# Patient Record
Sex: MALE | Race: WHITE | NOT HISPANIC OR LATINO | Employment: UNEMPLOYED | URBAN - METROPOLITAN AREA
[De-identification: names, ages, dates, MRNs, and addresses within clinical notes are randomized per-mention and may not be internally consistent; named-entity substitution may affect disease eponyms.]

---

## 2018-03-10 ENCOUNTER — HOSPITAL ENCOUNTER (EMERGENCY)
Facility: HOSPITAL | Age: 38
Discharge: HOME | End: 2018-03-10
Attending: EMERGENCY MEDICINE
Payer: MEDICAID

## 2018-03-10 VITALS
OXYGEN SATURATION: 100 % | WEIGHT: 170 LBS | DIASTOLIC BLOOD PRESSURE: 82 MMHG | TEMPERATURE: 98 F | RESPIRATION RATE: 18 BRPM | HEART RATE: 102 BPM | HEIGHT: 70 IN | BODY MASS INDEX: 24.34 KG/M2 | SYSTOLIC BLOOD PRESSURE: 143 MMHG

## 2018-03-10 DIAGNOSIS — R00.0 RAPID HEART BEAT: ICD-10-CM

## 2018-03-10 DIAGNOSIS — F15.10 METHAMPHETAMINE ABUSE: Primary | ICD-10-CM

## 2018-03-10 DIAGNOSIS — F41.9 ANXIETY: ICD-10-CM

## 2018-03-10 LAB
ALBUMIN SERPL BCP-MCNC: 4.9 G/DL
ALP SERPL-CCNC: 64 U/L
ALT SERPL W/O P-5'-P-CCNC: 65 U/L
AMPHET+METHAMPHET UR QL: NORMAL
ANION GAP SERPL CALC-SCNC: 10 MMOL/L
AST SERPL-CCNC: 53 U/L
BARBITURATES UR QL SCN>200 NG/ML: NEGATIVE
BASOPHILS # BLD AUTO: 0.02 K/UL
BASOPHILS NFR BLD: 0.2 %
BENZODIAZ UR QL SCN>200 NG/ML: NEGATIVE
BILIRUB SERPL-MCNC: 0.9 MG/DL
BUN SERPL-MCNC: 10 MG/DL
BZE UR QL SCN: NEGATIVE
CALCIUM SERPL-MCNC: 10.3 MG/DL
CANNABINOIDS UR QL SCN: NORMAL
CHLORIDE SERPL-SCNC: 101 MMOL/L
CO2 SERPL-SCNC: 27 MMOL/L
CREAT SERPL-MCNC: 1.2 MG/DL
CREAT UR-MCNC: 133.4 MG/DL
DIFFERENTIAL METHOD: NORMAL
EOSINOPHIL # BLD AUTO: 0.1 K/UL
EOSINOPHIL NFR BLD: 0.8 %
ERYTHROCYTE [DISTWIDTH] IN BLOOD BY AUTOMATED COUNT: 11.8 %
EST. GFR  (AFRICAN AMERICAN): >60 ML/MIN/1.73 M^2
EST. GFR  (NON AFRICAN AMERICAN): >60 ML/MIN/1.73 M^2
GLUCOSE SERPL-MCNC: 104 MG/DL
HCT VFR BLD AUTO: 41.1 %
HGB BLD-MCNC: 14.5 G/DL
LYMPHOCYTES # BLD AUTO: 1.9 K/UL
LYMPHOCYTES NFR BLD: 18.4 %
MCH RBC QN AUTO: 30.1 PG
MCHC RBC AUTO-ENTMCNC: 35.3 G/DL
MCV RBC AUTO: 85 FL
METHADONE UR QL SCN>300 NG/ML: NEGATIVE
MONOCYTES # BLD AUTO: 0.9 K/UL
MONOCYTES NFR BLD: 9 %
NEUTROPHILS # BLD AUTO: 7.3 K/UL
NEUTROPHILS NFR BLD: 71.4 %
OPIATES UR QL SCN: NEGATIVE
PCP UR QL SCN>25 NG/ML: NEGATIVE
PLATELET # BLD AUTO: 206 K/UL
PMV BLD AUTO: 10.1 FL
POTASSIUM SERPL-SCNC: 3.8 MMOL/L
PROT SERPL-MCNC: 8.8 G/DL
RBC # BLD AUTO: 4.81 M/UL
SODIUM SERPL-SCNC: 138 MMOL/L
TOXICOLOGY INFORMATION: NORMAL
WBC # BLD AUTO: 10.22 K/UL

## 2018-03-10 PROCEDURE — 93005 ELECTROCARDIOGRAM TRACING: CPT

## 2018-03-10 PROCEDURE — 80053 COMPREHEN METABOLIC PANEL: CPT

## 2018-03-10 PROCEDURE — 25000003 PHARM REV CODE 250: Performed by: EMERGENCY MEDICINE

## 2018-03-10 PROCEDURE — 85025 COMPLETE CBC W/AUTO DIFF WBC: CPT

## 2018-03-10 PROCEDURE — 99284 EMERGENCY DEPT VISIT MOD MDM: CPT | Mod: 25

## 2018-03-10 PROCEDURE — 93010 ELECTROCARDIOGRAM REPORT: CPT | Mod: ,,, | Performed by: INTERNAL MEDICINE

## 2018-03-10 PROCEDURE — 80307 DRUG TEST PRSMV CHEM ANLYZR: CPT

## 2018-03-10 RX ORDER — LORAZEPAM 0.5 MG/1
1 TABLET ORAL
Status: COMPLETED | OUTPATIENT
Start: 2018-03-10 | End: 2018-03-10

## 2018-03-10 RX ADMIN — LORAZEPAM 1 MG: 0.5 TABLET ORAL at 03:03

## 2018-03-10 NOTE — ED TRIAGE NOTES
"PT REPORTS FEELING ANXIOUS AFTER SPENDING A DAY IN LEGAL CUSTODY FOR A WARRANT. PT STATES HE FEELS PARANOID AND HE FEELS EVERYONE AROUND HIM IS "ACTING REALLY WEIRD". PT DENIES DRUG OR ALCOHOL CONSUMPTION. PT IS UNABLE TO MAINTAIN EYE CONTACT. PT DENIES SI/HI. PT IS A POOR HISTORIAN, UNABLE TO ANSWER QUESTIONS REGARDING PMH. NKA.   "

## 2018-03-10 NOTE — ED NOTES
ATTEMPT TO START IV AND DRAW BLOOD FOR LABS. LABS WERE DRAWN BUT PT RIPPED OUT THE IV. PT REFUSING IV AT THIS TIME. MD MADE AWARE.

## 2018-03-10 NOTE — ED NOTES
PT REFUSING TO KEEP PULSE Ox, BP CUFF, AND EKG LEADS ON FOR CONTINUOUS MONITORING. PT PACING AROUND THE ROOM. MD MADE AWARE.

## 2018-03-10 NOTE — ED PROVIDER NOTES
"Encounter Date: 3/10/2018       History     Chief Complaint   Patient presents with    Anxiety     "I have anxiety disorder. Panic attacks." denies SI or HI; denies recent alcohol or drug usage     Had been feeling anxious for past 2 days. Also with ideation that lots of people that are not known to him are filming him on their phones. He had been picked up for an old warrant yesterday. Last drug use was marijuana 1 week ago. Had been relapsing on methamphetamine use. Last used about 2 weeks ago after a 1-2 month binge. Had no thoughts of harming self or others. Poor sleep recently. No physical symptoms.           Review of patient's allergies indicates:  No Known Allergies  History reviewed. No pertinent past medical history.  History reviewed. No pertinent surgical history.  History reviewed. No pertinent family history.  Social History   Substance Use Topics    Smoking status: Current Some Day Smoker    Smokeless tobacco: Not on file    Alcohol use Yes     Review of Systems   Constitutional: Positive for activity change and fatigue. Negative for chills, diaphoresis and fever.   HENT: Negative.    Eyes: Negative.    Respiratory: Negative.    Cardiovascular: Positive for palpitations.   Gastrointestinal: Negative.    Genitourinary: Negative.    Musculoskeletal: Negative.    Skin:        Superficial scrapes to arms from job as    Neurological: Negative.    Psychiatric/Behavioral: Positive for agitation, confusion, decreased concentration and hallucinations. Negative for self-injury. The patient is nervous/anxious.    All other systems reviewed and are negative.      Physical Exam     Initial Vitals [03/10/18 1430]   BP Pulse Resp Temp SpO2   138/89 (!) 137 20 98.1 °F (36.7 °C) 96 %      MAP       105.33         Physical Exam    Nursing note and vitals reviewed.  Constitutional: He appears well-developed and well-nourished.   Awake, alert, appropriate.   HENT:   Head: Normocephalic and atraumatic. "   Mouth/Throat: Oropharynx is clear and moist.   Eyes: Conjunctivae and EOM are normal. Pupils are equal, round, and reactive to light.   Neck: Normal range of motion. Neck supple.   Cardiovascular: Regular rhythm, normal heart sounds and intact distal pulses.   Mild tachycardia at about 110   Pulmonary/Chest: Breath sounds normal. No respiratory distress. He has no rhonchi. He exhibits no tenderness.   Abdominal: Soft. Bowel sounds are normal.   Musculoskeletal: Normal range of motion.   Neurological: He is alert and oriented to person, place, and time. He has normal strength. No cranial nerve deficit or sensory deficit.   Skin: Skin is warm and dry. Capillary refill takes less than 2 seconds.   Superficial abrasions to bilat upper exterm in various states of healing.    Psychiatric: His behavior is normal. Judgment and thought content normal.   Anxious.         ED Course   Procedures  Labs Reviewed   COMPREHENSIVE METABOLIC PANEL - Abnormal; Notable for the following:        Result Value    Total Protein 8.8 (*)     AST 53 (*)     ALT 65 (*)     All other components within normal limits   DRUG SCREEN PANEL, URINE EMERGENCY   CBC W/ AUTO DIFFERENTIAL     EKG Readings: (Independently Interpreted)   Initial Reading: No STEMI. Rhythm: Sinus Tachycardia. Heart Rate: 105. Ectopy: No Ectopy. Conduction: Normal. ST Segments: Normal ST Segments. T Waves: Normal. Axis: Normal. Clinical Impression: Sinus Tachycardia          Medical Decision Making:   ED Management:  Self d/c off of zoloft about a yr ago. Had been relapsing on speed intermittently. Situational anxiety related to recent arrest and poor sleep. No hi/si. No psychiatric emergent risk identified. Pt states tachycardia was because he ran about a mile to the hospital.     UDS seems to indicate more recent amphetamine use. Zackary d/c home with girlfriend.                       Clinical Impression:   The primary encounter diagnosis was Methamphetamine abuse. Diagnoses  of Rapid heart beat and Anxiety were also pertinent to this visit.                           Juan Pablo Brown MD  03/10/18 2335

## 2018-03-11 NOTE — ED NOTES
LAB RESULTS RECEIVED VIA FAX, GIVEN TO MD TO REVIEW. UA WAS RESULTED INSTEAD OF URINE DRUG SCREEN. LAB NOTIFIED, THEY WILL RUN THE URINE DRUG SCREEN NOW.

## 2020-02-15 ENCOUNTER — HOSPITAL ENCOUNTER (EMERGENCY)
Facility: HOSPITAL | Age: 40
Discharge: HOME | End: 2020-02-15
Attending: EMERGENCY MEDICINE
Payer: MEDICAID

## 2020-02-15 VITALS
WEIGHT: 195 LBS | SYSTOLIC BLOOD PRESSURE: 114 MMHG | HEART RATE: 99 BPM | RESPIRATION RATE: 18 BRPM | TEMPERATURE: 98 F | BODY MASS INDEX: 27.92 KG/M2 | HEIGHT: 70 IN | DIASTOLIC BLOOD PRESSURE: 56 MMHG | OXYGEN SATURATION: 95 %

## 2020-02-15 DIAGNOSIS — R11.2 NON-INTRACTABLE VOMITING WITH NAUSEA, UNSPECIFIED VOMITING TYPE: Primary | ICD-10-CM

## 2020-02-15 DIAGNOSIS — F19.10 POLYSUBSTANCE ABUSE: ICD-10-CM

## 2020-02-15 DIAGNOSIS — R00.0 TACHYCARDIA: ICD-10-CM

## 2020-02-15 LAB
ALBUMIN SERPL BCP-MCNC: 4 G/DL (ref 3.5–5.2)
ALP SERPL-CCNC: 109 U/L (ref 55–135)
ALT SERPL W/O P-5'-P-CCNC: 280 U/L (ref 10–44)
AMPHET+METHAMPHET UR QL: NORMAL
ANION GAP SERPL CALC-SCNC: 11 MMOL/L (ref 8–16)
AST SERPL-CCNC: 450 U/L (ref 10–40)
BARBITURATES UR QL SCN>200 NG/ML: NEGATIVE
BASOPHILS # BLD AUTO: 0.02 K/UL (ref 0–0.2)
BASOPHILS NFR BLD: 0.4 % (ref 0–1.9)
BENZODIAZ UR QL SCN>200 NG/ML: NEGATIVE
BILIRUB SERPL-MCNC: 1 MG/DL (ref 0.1–1)
BILIRUB UR QL STRIP: NEGATIVE
BUN SERPL-MCNC: 18 MG/DL (ref 6–20)
BZE UR QL SCN: NORMAL
CALCIUM SERPL-MCNC: 8.7 MG/DL (ref 8.7–10.5)
CANNABINOIDS UR QL SCN: NEGATIVE
CHLORIDE SERPL-SCNC: 107 MMOL/L (ref 95–110)
CK SERPL-CCNC: 152 U/L (ref 20–200)
CLARITY UR: CLEAR
CO2 SERPL-SCNC: 22 MMOL/L (ref 23–29)
COLOR UR: YELLOW
CREAT SERPL-MCNC: 0.9 MG/DL (ref 0.5–1.4)
CREAT UR-MCNC: 94.7 MG/DL (ref 23–375)
DIFFERENTIAL METHOD: ABNORMAL
EOSINOPHIL # BLD AUTO: 0 K/UL (ref 0–0.5)
EOSINOPHIL NFR BLD: 0.4 % (ref 0–8)
ERYTHROCYTE [DISTWIDTH] IN BLOOD BY AUTOMATED COUNT: 11.5 % (ref 11.5–14.5)
EST. GFR  (AFRICAN AMERICAN): >60 ML/MIN/1.73 M^2
EST. GFR  (NON AFRICAN AMERICAN): >60 ML/MIN/1.73 M^2
ETHANOL SERPL-MCNC: <10 MG/DL
GLUCOSE SERPL-MCNC: 97 MG/DL (ref 70–110)
GLUCOSE UR QL STRIP: NEGATIVE
HCT VFR BLD AUTO: 39.3 % (ref 40–54)
HGB BLD-MCNC: 13.3 G/DL (ref 14–18)
HGB UR QL STRIP: NEGATIVE
IMM GRANULOCYTES # BLD AUTO: 0.02 K/UL (ref 0–0.04)
IMM GRANULOCYTES NFR BLD AUTO: 0.4 % (ref 0–0.5)
KETONES UR QL STRIP: NEGATIVE
LEUKOCYTE ESTERASE UR QL STRIP: NEGATIVE
LYMPHOCYTES # BLD AUTO: 0.3 K/UL (ref 1–4.8)
LYMPHOCYTES NFR BLD: 5.1 % (ref 18–48)
MAGNESIUM SERPL-MCNC: 1.6 MG/DL (ref 1.6–2.6)
MCH RBC QN AUTO: 29.4 PG (ref 27–31)
MCHC RBC AUTO-ENTMCNC: 33.8 G/DL (ref 32–36)
MCV RBC AUTO: 87 FL (ref 82–98)
METHADONE UR QL SCN>300 NG/ML: NEGATIVE
MONOCYTES # BLD AUTO: 0 K/UL (ref 0.3–1)
MONOCYTES NFR BLD: 0.2 % (ref 4–15)
NEUTROPHILS # BLD AUTO: 5.2 K/UL (ref 1.8–7.7)
NEUTROPHILS NFR BLD: 93.5 % (ref 38–73)
NITRITE UR QL STRIP: NEGATIVE
NRBC BLD-RTO: 0 /100 WBC
OPIATES UR QL SCN: NEGATIVE
PCP UR QL SCN>25 NG/ML: NEGATIVE
PH UR STRIP: 5 [PH] (ref 5–8)
PHOSPHATE SERPL-MCNC: 1.4 MG/DL (ref 2.7–4.5)
PLATELET # BLD AUTO: 130 K/UL (ref 150–350)
PMV BLD AUTO: 9.4 FL (ref 9.2–12.9)
POTASSIUM SERPL-SCNC: 3.4 MMOL/L (ref 3.5–5.1)
PROT SERPL-MCNC: 7 G/DL (ref 6–8.4)
PROT UR QL STRIP: NEGATIVE
RBC # BLD AUTO: 4.53 M/UL (ref 4.6–6.2)
SODIUM SERPL-SCNC: 140 MMOL/L (ref 136–145)
SP GR UR STRIP: 1.01 (ref 1–1.03)
TOXICOLOGY INFORMATION: NORMAL
TSH SERPL DL<=0.005 MIU/L-ACNC: 2.72 UIU/ML (ref 0.4–4)
URN SPEC COLLECT METH UR: NORMAL
UROBILINOGEN UR STRIP-ACNC: NEGATIVE EU/DL
WBC # BLD AUTO: 5.52 K/UL (ref 3.9–12.7)

## 2020-02-15 PROCEDURE — 93005 ELECTROCARDIOGRAM TRACING: CPT

## 2020-02-15 PROCEDURE — 99284 EMERGENCY DEPT VISIT MOD MDM: CPT | Mod: 25

## 2020-02-15 PROCEDURE — 82550 ASSAY OF CK (CPK): CPT

## 2020-02-15 PROCEDURE — 80320 DRUG SCREEN QUANTALCOHOLS: CPT

## 2020-02-15 PROCEDURE — 96360 HYDRATION IV INFUSION INIT: CPT

## 2020-02-15 PROCEDURE — 84100 ASSAY OF PHOSPHORUS: CPT

## 2020-02-15 PROCEDURE — 93010 ELECTROCARDIOGRAM REPORT: CPT | Mod: ,,, | Performed by: INTERNAL MEDICINE

## 2020-02-15 PROCEDURE — 63600175 PHARM REV CODE 636 W HCPCS: Performed by: EMERGENCY MEDICINE

## 2020-02-15 PROCEDURE — 80053 COMPREHEN METABOLIC PANEL: CPT

## 2020-02-15 PROCEDURE — 80307 DRUG TEST PRSMV CHEM ANLYZR: CPT

## 2020-02-15 PROCEDURE — 83735 ASSAY OF MAGNESIUM: CPT

## 2020-02-15 PROCEDURE — 85025 COMPLETE CBC W/AUTO DIFF WBC: CPT

## 2020-02-15 PROCEDURE — 84443 ASSAY THYROID STIM HORMONE: CPT

## 2020-02-15 PROCEDURE — 93010 EKG 12-LEAD: ICD-10-PCS | Mod: ,,, | Performed by: INTERNAL MEDICINE

## 2020-02-15 PROCEDURE — 81003 URINALYSIS AUTO W/O SCOPE: CPT | Mod: 59

## 2020-02-15 RX ORDER — SERTRALINE HYDROCHLORIDE 100 MG/1
100 TABLET, FILM COATED ORAL
COMMUNITY
Start: 2019-11-12

## 2020-02-15 RX ORDER — ONDANSETRON 4 MG/1
4 TABLET, FILM COATED ORAL EVERY 6 HOURS
Qty: 12 TABLET | Refills: 0 | Status: SHIPPED | OUTPATIENT
Start: 2020-02-15

## 2020-02-15 RX ORDER — IBUPROFEN 600 MG/1
600 TABLET ORAL
COMMUNITY
Start: 2019-12-19

## 2020-02-15 RX ORDER — BUPROPION HYDROCHLORIDE 150 MG/1
150 TABLET ORAL
COMMUNITY

## 2020-02-15 RX ORDER — GABAPENTIN 800 MG/1
800 TABLET ORAL
COMMUNITY
Start: 2019-10-16

## 2020-02-15 RX ORDER — BUPRENORPHINE AND NALOXONE 8; 2 MG/1; MG/1
3 FILM, SOLUBLE BUCCAL; SUBLINGUAL
COMMUNITY
Start: 2020-01-08

## 2020-02-15 RX ORDER — HYDROXYZINE PAMOATE 100 MG/1
100 CAPSULE ORAL
COMMUNITY

## 2020-02-15 RX ORDER — ONDANSETRON 4 MG/1
4 TABLET, FILM COATED ORAL EVERY 6 HOURS
Qty: 12 TABLET | Refills: 0 | Status: SHIPPED | OUTPATIENT
Start: 2020-02-15 | End: 2020-02-15 | Stop reason: SDUPTHER

## 2020-02-15 RX ADMIN — SODIUM CHLORIDE 1000 ML: 0.9 INJECTION, SOLUTION INTRAVENOUS at 02:02

## 2020-02-15 NOTE — ED PROVIDER NOTES
Encounter Date: 2/15/2020    SCRIBE #1 NOTE: I, Blake Mahan, am scribing for, and in the presence of,  Marvin Gold MD. I have scribed the following portions of the note - Other sections scribed: HPI/ROS/PE.       History     Chief Complaint   Patient presents with    Drug Overdose     pt present to the ED via EMS reports shot up possible meth x 3 hrs ago. pt shot up in bilateral arms. pt c/o serve muscle cramps in the back; vomiting x 15 epiosides, dry mouth. pt reports he do not think it was meth.     Vomiting     This 39 y.o. male with a medical history of anxiety, depression, and PTSD presents to the ED via EMS for an emergent evaluation of multiple symptoms following drug usage today. Pt used meth today and injected it into the BUEs approximately 3 hours PTA. Since usage, pt reports n/v with about 15 episodes, severe back pain, generalized body aches, arm pain, mild cough, and chills. Pt reports he does not think the drug he used was meth because he has never experienced this feeling or symptoms when he used it before in the past. EMS suspected possible rhadomyolysis. No modifying factors. Otherwise, no fever, chills, and any other associated symptoms.     The history is provided by the patient. No  was used.     Review of patient's allergies indicates:  No Known Allergies  Past Medical History:   Diagnosis Date    Anxiety     Depression     PTSD (post-traumatic stress disorder)      History reviewed. No pertinent surgical history.  History reviewed. No pertinent family history.  Social History     Tobacco Use    Smoking status: Current Some Day Smoker   Substance Use Topics    Alcohol use: Yes    Drug use: Yes     Types: IV     Review of Systems   Constitutional: Positive for chills. Negative for activity change, diaphoresis and fever.   HENT: Negative for congestion, drooling, ear pain, rhinorrhea, sneezing, sore throat and trouble swallowing.    Eyes: Negative for  pain.   Respiratory: Positive for cough (mild). Negative for chest tightness, shortness of breath, wheezing and stridor.    Cardiovascular: Negative for chest pain, palpitations and leg swelling.   Gastrointestinal: Positive for nausea and vomiting. Negative for abdominal distention, abdominal pain, constipation and diarrhea.   Genitourinary: Negative for difficulty urinating, dysuria, frequency and urgency.   Musculoskeletal: Positive for back pain and myalgias (generalized; arm pain). Negative for arthralgias, neck pain and neck stiffness.   Skin: Negative for pallor, rash and wound.   Neurological: Negative for dizziness, syncope, weakness, light-headedness, numbness and headaches.   All other systems reviewed and are negative.      Physical Exam     Initial Vitals [02/15/20 0108]   BP Pulse Resp Temp SpO2   118/74 (!) 120 18 98.4 °F (36.9 °C) 99 %      MAP       --         Physical Exam    Nursing note and vitals reviewed.  Constitutional: He appears well-developed and well-nourished. He is not diaphoretic. No distress.   HENT:   Head: Normocephalic and atraumatic.   Right Ear: External ear normal.   Left Ear: External ear normal.   Mouth/Throat: Oropharynx is clear and moist.   Eyes: Right eye exhibits no discharge. Left eye exhibits no discharge. No scleral icterus.   Neck: No tracheal deviation present. No JVD present.   Cardiovascular: Regular rhythm, normal heart sounds and intact distal pulses. Tachycardia present.  Exam reveals no gallop and no friction rub.    No murmur heard.  Pulmonary/Chest: Breath sounds normal. No stridor. No respiratory distress. He has no wheezes. He has no rales.   Abdominal: Soft. He exhibits no distension. There is no tenderness. There is no guarding.   Musculoskeletal: Normal range of motion. He exhibits no edema or tenderness.   Neurological: He is alert and oriented to person, place, and time. He has normal strength. GCS eye subscore is 4. GCS verbal subscore is 5. GCS motor  subscore is 6.   LEANA with NGND's   Skin: Skin is warm and dry. Capillary refill takes less than 2 seconds. No rash noted. No erythema.   2 small abrasions to the bilateral, anterior antecubital regions of the UEs where pt injected IV drug.    Psychiatric: He has a normal mood and affect. His behavior is normal. Judgment and thought content normal.         ED Course   Procedures  Labs Reviewed   COMPREHENSIVE METABOLIC PANEL - Abnormal; Notable for the following components:       Result Value    Potassium 3.4 (*)     CO2 22 (*)      (*)      (*)     All other components within normal limits   CBC W/ AUTO DIFFERENTIAL - Abnormal; Notable for the following components:    RBC 4.53 (*)     Hemoglobin 13.3 (*)     Hematocrit 39.3 (*)     Platelets 130 (*)     Lymph # 0.3 (*)     Mono # 0.0 (*)     Gran% 93.5 (*)     Lymph% 5.1 (*)     Mono% 0.2 (*)     All other components within normal limits   PHOSPHORUS - Abnormal; Notable for the following components:    Phosphorus 1.4 (*)     All other components within normal limits   CK   MAGNESIUM   DRUG SCREEN PANEL, URINE EMERGENCY    Narrative:     Preferred Collection Type->Urine, Clean Catch   URINALYSIS, REFLEX TO URINE CULTURE    Narrative:     Preferred Collection Type->Urine, Clean Catch   TSH   ALCOHOL,MEDICAL (ETHANOL)          Imaging Results    None                     Scribe Attestation:   Scribe #1: I performed the above scribed service and the documentation accurately describes the services I performed. I attest to the accuracy of the note.      Pt arrived alert, afebrile, non-toxic in appearance, in no acute respiratory distress with tachycardia secondary to endorsed use of sympathomimetics PTA.  UDS positive for cocaine and meth with remainder of labs unremarkable.  Pt tolerated PO intake w/o difficulty.  PE unremarkable other than tachycardia that improved with IVF's.  Pt discharged and counseled on the need to return to the nearest emergency room  if they experience any other concerning symptoms.  Pt counseled to F/U outpatient with a PCP over the next two to three days.    Marvin Gold MD                            Clinical Impression:       ICD-10-CM ICD-9-CM   1. Non-intractable vomiting with nausea, unspecified vomiting type R11.2 787.01   2. Tachycardia R00.0 785.0   3. Polysubstance abuse F19.10 305.90             I, Marvin Gold, personally performed the services described in this documentation. All medical record entries made by the scribe were at my direction and in my presence.  I have reviewed the chart and agree that the record reflects my personal performance and is accurate and complete.                   Marvin Gold MD  02/15/20 2033

## 2020-02-15 NOTE — ED TRIAGE NOTES
pt present to the ED via EMS reports shot up possible meth x 3 hrs ago. pt shot up in bilateral arms. pt c/o serve muscle cramps in the back; vomiting x 15 epiosides, dry mouth. pt reports he do not think it was meth because he took meth before and never felt this way.

## 2020-06-03 ENCOUNTER — HOSPITAL ENCOUNTER (EMERGENCY)
Facility: HOSPITAL | Age: 40
Discharge: HOME | End: 2020-06-03
Attending: EMERGENCY MEDICINE
Payer: MEDICAID

## 2020-06-03 VITALS
RESPIRATION RATE: 18 BRPM | SYSTOLIC BLOOD PRESSURE: 115 MMHG | HEART RATE: 97 BPM | OXYGEN SATURATION: 100 % | BODY MASS INDEX: 27.98 KG/M2 | WEIGHT: 195 LBS | TEMPERATURE: 98 F | DIASTOLIC BLOOD PRESSURE: 72 MMHG

## 2020-06-03 DIAGNOSIS — R00.2 PALPITATIONS: Primary | ICD-10-CM

## 2020-06-03 DIAGNOSIS — F19.10 POLYSUBSTANCE ABUSE: ICD-10-CM

## 2020-06-03 DIAGNOSIS — R06.4 HYPERVENTILATION: ICD-10-CM

## 2020-06-03 DIAGNOSIS — F15.10 AMPHETAMINE ABUSE: ICD-10-CM

## 2020-06-03 DIAGNOSIS — R07.9 CHEST PAIN: ICD-10-CM

## 2020-06-03 DIAGNOSIS — F14.10 COCAINE ABUSE: ICD-10-CM

## 2020-06-03 LAB
ALBUMIN SERPL BCP-MCNC: 4.8 G/DL (ref 3.5–5.2)
ALP SERPL-CCNC: 70 U/L (ref 55–135)
ALT SERPL W/O P-5'-P-CCNC: 11 U/L (ref 10–44)
AMPHET+METHAMPHET UR QL: NORMAL
ANION GAP SERPL CALC-SCNC: 9 MMOL/L (ref 8–16)
AST SERPL-CCNC: 22 U/L (ref 10–40)
BARBITURATES UR QL SCN>200 NG/ML: NEGATIVE
BASOPHILS # BLD AUTO: 0.03 K/UL (ref 0–0.2)
BASOPHILS NFR BLD: 0.5 % (ref 0–1.9)
BENZODIAZ UR QL SCN>200 NG/ML: NEGATIVE
BILIRUB SERPL-MCNC: 0.5 MG/DL (ref 0.1–1)
BILIRUB UR QL STRIP: NEGATIVE
BNP SERPL-MCNC: <10 PG/ML (ref 0–99)
BUN SERPL-MCNC: 12 MG/DL (ref 6–20)
BZE UR QL SCN: NORMAL
CALCIUM SERPL-MCNC: 9.8 MG/DL (ref 8.7–10.5)
CANNABINOIDS UR QL SCN: NEGATIVE
CHLORIDE SERPL-SCNC: 104 MMOL/L (ref 95–110)
CLARITY UR: CLEAR
CO2 SERPL-SCNC: 23 MMOL/L (ref 23–29)
COLOR UR: YELLOW
CREAT SERPL-MCNC: 1.1 MG/DL (ref 0.5–1.4)
CREAT UR-MCNC: 152.5 MG/DL (ref 23–375)
D DIMER PPP IA.FEU-MCNC: 0.33 MG/L FEU
DIFFERENTIAL METHOD: NORMAL
EOSINOPHIL # BLD AUTO: 0 K/UL (ref 0–0.5)
EOSINOPHIL NFR BLD: 0.5 % (ref 0–8)
ERYTHROCYTE [DISTWIDTH] IN BLOOD BY AUTOMATED COUNT: 12.1 % (ref 11.5–14.5)
EST. GFR  (AFRICAN AMERICAN): >60 ML/MIN/1.73 M^2
EST. GFR  (NON AFRICAN AMERICAN): >60 ML/MIN/1.73 M^2
ETHANOL SERPL-MCNC: <10 MG/DL
GLUCOSE SERPL-MCNC: 152 MG/DL (ref 70–110)
GLUCOSE UR QL STRIP: NEGATIVE
HCT VFR BLD AUTO: 43.1 % (ref 40–54)
HGB BLD-MCNC: 14.9 G/DL (ref 14–18)
HGB UR QL STRIP: NEGATIVE
IMM GRANULOCYTES # BLD AUTO: 0.01 K/UL (ref 0–0.04)
IMM GRANULOCYTES NFR BLD AUTO: 0.2 % (ref 0–0.5)
KETONES UR QL STRIP: NEGATIVE
LEUKOCYTE ESTERASE UR QL STRIP: NEGATIVE
LYMPHOCYTES # BLD AUTO: 1.7 K/UL (ref 1–4.8)
LYMPHOCYTES NFR BLD: 26.7 % (ref 18–48)
MAGNESIUM SERPL-MCNC: 1.9 MG/DL (ref 1.6–2.6)
MCH RBC QN AUTO: 29.3 PG (ref 27–31)
MCHC RBC AUTO-ENTMCNC: 34.6 G/DL (ref 32–36)
MCV RBC AUTO: 85 FL (ref 82–98)
METHADONE UR QL SCN>300 NG/ML: NEGATIVE
MONOCYTES # BLD AUTO: 0.3 K/UL (ref 0.3–1)
MONOCYTES NFR BLD: 4.3 % (ref 4–15)
NEUTROPHILS # BLD AUTO: 4.4 K/UL (ref 1.8–7.7)
NEUTROPHILS NFR BLD: 67.8 % (ref 38–73)
NITRITE UR QL STRIP: NEGATIVE
NRBC BLD-RTO: 0 /100 WBC
OPIATES UR QL SCN: NEGATIVE
PCP UR QL SCN>25 NG/ML: NEGATIVE
PH UR STRIP: 6 [PH] (ref 5–8)
PLATELET # BLD AUTO: 187 K/UL (ref 150–350)
PMV BLD AUTO: 9.9 FL (ref 9.2–12.9)
POTASSIUM SERPL-SCNC: 3.5 MMOL/L (ref 3.5–5.1)
PROT SERPL-MCNC: 8.1 G/DL (ref 6–8.4)
PROT UR QL STRIP: NEGATIVE
RBC # BLD AUTO: 5.09 M/UL (ref 4.6–6.2)
SODIUM SERPL-SCNC: 136 MMOL/L (ref 136–145)
SP GR UR STRIP: 1.02 (ref 1–1.03)
TOXICOLOGY INFORMATION: NORMAL
TROPONIN I SERPL DL<=0.01 NG/ML-MCNC: <0.006 NG/ML (ref 0–0.03)
TROPONIN I SERPL DL<=0.01 NG/ML-MCNC: <0.006 NG/ML (ref 0–0.03)
URN SPEC COLLECT METH UR: ABNORMAL
UROBILINOGEN UR STRIP-ACNC: ABNORMAL EU/DL
WBC # BLD AUTO: 6.44 K/UL (ref 3.9–12.7)

## 2020-06-03 PROCEDURE — 80053 COMPREHEN METABOLIC PANEL: CPT

## 2020-06-03 PROCEDURE — 63600175 PHARM REV CODE 636 W HCPCS: Performed by: NURSE PRACTITIONER

## 2020-06-03 PROCEDURE — 99203 PR OFFICE/OUTPT VISIT, NEW, LEVL III, 30-44 MIN: ICD-10-PCS | Mod: 95,SA,HB, | Performed by: NURSE PRACTITIONER

## 2020-06-03 PROCEDURE — 93005 ELECTROCARDIOGRAM TRACING: CPT

## 2020-06-03 PROCEDURE — 96374 THER/PROPH/DIAG INJ IV PUSH: CPT

## 2020-06-03 PROCEDURE — 83735 ASSAY OF MAGNESIUM: CPT

## 2020-06-03 PROCEDURE — 63600175 PHARM REV CODE 636 W HCPCS

## 2020-06-03 PROCEDURE — 84484 ASSAY OF TROPONIN QUANT: CPT | Mod: 91

## 2020-06-03 PROCEDURE — 99285 EMERGENCY DEPT VISIT HI MDM: CPT | Mod: 25

## 2020-06-03 PROCEDURE — 84484 ASSAY OF TROPONIN QUANT: CPT

## 2020-06-03 PROCEDURE — 85025 COMPLETE CBC W/AUTO DIFF WBC: CPT

## 2020-06-03 PROCEDURE — 81003 URINALYSIS AUTO W/O SCOPE: CPT | Mod: 59

## 2020-06-03 PROCEDURE — 99203 OFFICE O/P NEW LOW 30 MIN: CPT | Mod: 95,SA,HB, | Performed by: NURSE PRACTITIONER

## 2020-06-03 PROCEDURE — 80320 DRUG SCREEN QUANTALCOHOLS: CPT

## 2020-06-03 PROCEDURE — 25500020 PHARM REV CODE 255: Performed by: EMERGENCY MEDICINE

## 2020-06-03 PROCEDURE — 85379 FIBRIN DEGRADATION QUANT: CPT

## 2020-06-03 PROCEDURE — 80307 DRUG TEST PRSMV CHEM ANLYZR: CPT

## 2020-06-03 PROCEDURE — 25000003 PHARM REV CODE 250: Performed by: NURSE PRACTITIONER

## 2020-06-03 PROCEDURE — 96361 HYDRATE IV INFUSION ADD-ON: CPT

## 2020-06-03 PROCEDURE — 96375 TX/PRO/DX INJ NEW DRUG ADDON: CPT

## 2020-06-03 PROCEDURE — 93010 EKG 12-LEAD: ICD-10-PCS | Mod: ,,, | Performed by: INTERNAL MEDICINE

## 2020-06-03 PROCEDURE — 83880 ASSAY OF NATRIURETIC PEPTIDE: CPT

## 2020-06-03 PROCEDURE — 93010 ELECTROCARDIOGRAM REPORT: CPT | Mod: ,,, | Performed by: INTERNAL MEDICINE

## 2020-06-03 RX ORDER — KETOROLAC TROMETHAMINE 30 MG/ML
15 INJECTION, SOLUTION INTRAMUSCULAR; INTRAVENOUS
Status: COMPLETED | OUTPATIENT
Start: 2020-06-03 | End: 2020-06-03

## 2020-06-03 RX ORDER — LORAZEPAM 2 MG/ML
INJECTION INTRAMUSCULAR
Status: COMPLETED
Start: 2020-06-03 | End: 2020-06-03

## 2020-06-03 RX ORDER — ONDANSETRON 2 MG/ML
4 INJECTION INTRAMUSCULAR; INTRAVENOUS
Status: COMPLETED | OUTPATIENT
Start: 2020-06-03 | End: 2020-06-03

## 2020-06-03 RX ORDER — LORAZEPAM 2 MG/ML
1 INJECTION INTRAMUSCULAR
Status: DISCONTINUED | OUTPATIENT
Start: 2020-06-03 | End: 2020-06-03

## 2020-06-03 RX ORDER — LORAZEPAM 2 MG/ML
1 INJECTION INTRAMUSCULAR
Status: COMPLETED | OUTPATIENT
Start: 2020-06-03 | End: 2020-06-03

## 2020-06-03 RX ADMIN — KETOROLAC TROMETHAMINE 15 MG: 30 INJECTION, SOLUTION INTRAMUSCULAR at 09:06

## 2020-06-03 RX ADMIN — LORAZEPAM 1 MG: 2 INJECTION INTRAMUSCULAR at 05:06

## 2020-06-03 RX ADMIN — SODIUM CHLORIDE 1000 ML: 0.9 INJECTION, SOLUTION INTRAVENOUS at 09:06

## 2020-06-03 RX ADMIN — ONDANSETRON HYDROCHLORIDE 4 MG: 2 SOLUTION INTRAMUSCULAR; INTRAVENOUS at 09:06

## 2020-06-03 RX ADMIN — IOHEXOL 100 ML: 350 INJECTION, SOLUTION INTRAVENOUS at 09:06

## 2020-06-03 RX ADMIN — LORAZEPAM 1 MG: 2 INJECTION, SOLUTION INTRAMUSCULAR; INTRAVENOUS at 05:06

## 2020-06-03 NOTE — ED PROVIDER NOTES
Encounter Date: 6/3/2020       History     Chief Complaint   Patient presents with    Palpitations     Pt c/o chest pain and palpitations x 1 hr, denies ETOH/drug use.  Pt noncompliant with IV attempt en route.       HPI   Patient is a 39-year-old male who presents with palpitations x1 hour.  He was seen recently at another facility for methamphetamine and cocaine abuse with similar symptoms.  He states he is not having pain anywhere but feels numbness in both of his arms.  He is anxious and hyperventilating during that shell assessment.  He endorses shortness of breath but denies nausea or vomiting.    Review of patient's allergies indicates:  No Known Allergies  Past Medical History:   Diagnosis Date    Anxiety     Depression     PTSD (post-traumatic stress disorder)      History reviewed. No pertinent surgical history.  History reviewed. No pertinent family history.  Social History     Tobacco Use    Smoking status: Current Some Day Smoker    Smokeless tobacco: Never Used   Substance Use Topics    Alcohol use: Yes    Drug use: Yes     Types: IV, Methamphetamines     Review of Systems   Constitutional: Negative for appetite change, chills, diaphoresis, fatigue and fever.   HENT: Negative for congestion, ear discharge, ear pain, postnasal drip, rhinorrhea, sinus pressure, sneezing, sore throat and voice change.    Eyes: Negative for discharge, itching and visual disturbance.   Respiratory: Negative for cough, shortness of breath and wheezing.    Cardiovascular: Positive for palpitations. Negative for chest pain and leg swelling.   Gastrointestinal: Negative for abdominal pain, nausea and vomiting.   Endocrine: Negative for polydipsia, polyphagia and polyuria.   Genitourinary: Negative for difficulty urinating, discharge, dysuria, frequency, hematuria, penile pain, penile swelling and urgency.   Musculoskeletal: Negative for arthralgias and myalgias.   Skin: Negative for rash and wound.   Neurological:  Negative for dizziness, seizures, syncope and weakness.   Hematological: Negative for adenopathy. Does not bruise/bleed easily.   Psychiatric/Behavioral: Negative for agitation and self-injury. The patient is not nervous/anxious.        Physical Exam     Initial Vitals [06/03/20 0524]   BP Pulse Resp Temp SpO2   138/76 95 18 98.4 °F (36.9 °C) 98 %      MAP       --         Physical Exam    Nursing note and vitals reviewed.  Constitutional: He appears well-developed and well-nourished. He is not diaphoretic. No distress. He is not intubated.   HENT:   Head: Normocephalic and atraumatic.   Right Ear: External ear normal.   Left Ear: External ear normal.   Nose: Nose normal.   Eyes: Pupils are equal, round, and reactive to light. Right eye exhibits no discharge. Left eye exhibits no discharge. No scleral icterus.   Neck: Normal range of motion.   Cardiovascular: Regular rhythm, S1 normal, S2 normal and normal heart sounds. Tachycardia present.  Exam reveals no gallop.    No murmur heard.  Pulmonary/Chest: Effort normal and breath sounds normal. No accessory muscle usage. Tachypnea noted. No apnea and no bradypnea. He is not intubated. No respiratory distress. He has no decreased breath sounds. He has no wheezes. He has no rhonchi. He has no rales.   Abdominal: He exhibits no distension.   Musculoskeletal: Normal range of motion.   Neurological: He is alert and oriented to person, place, and time.   Skin: Skin is dry. Capillary refill takes less than 2 seconds.         ED Course   Procedures  Labs Reviewed   COMPREHENSIVE METABOLIC PANEL - Abnormal; Notable for the following components:       Result Value    Glucose 152 (*)     All other components within normal limits   URINALYSIS, REFLEX TO URINE CULTURE - Abnormal; Notable for the following components:    Urobilinogen, UA 2.0-3.0 (*)     All other components within normal limits    Narrative:     Preferred Collection Type->Urine, Clean Catch   CBC W/ AUTO  DIFFERENTIAL   TROPONIN I   B-TYPE NATRIURETIC PEPTIDE   DRUG SCREEN PANEL, URINE EMERGENCY   ALCOHOL,MEDICAL (ETHANOL)   MAGNESIUM   TROPONIN I   D DIMER, QUANTITATIVE        ECG Results          EKG 12-lead (Final result)  Result time 06/03/20 19:54:28    Final result by Interface, Lab In [facility] (06/03/20 19:54:28)                 Narrative:    Test Reason : R07.9,    Vent. Rate : 151 BPM     Atrial Rate : 151 BPM     P-R Int : 128 ms          QRS Dur : 080 ms      QT Int : 316 ms       P-R-T Axes : 081 -40 083 degrees     QTc Int : 500 ms    Sinus tachycardia  Left axis deviation  Nonspecific ST and T wave abnormality  Abnormal ECG  When compared with ECG of 03-JUN-2020 05:32,  Significant changes have occurred  Confirmed by Cheyenne SAGE, Mara MERRITT ([id number]) on 6/3/2020 7:54:23 PM    Referred By: AAANENA   SELF           Confirmed By:Mara Quinn MD                            Imaging Results          CT Abdomen Pelvis With Contrast (Final result)  Result time 06/03/20 09:28:40    Final result by George Bell MD (06/03/20 09:28:40)                 Impression:      No acute findings.    Normal appendix.      Electronically signed by: George Bell MD  Date:    06/03/2020  Time:    09:28             Narrative:    EXAMINATION:  CT ABDOMEN PELVIS WITH CONTRAST    CLINICAL HISTORY:  RLQ pain, appendicitis suspected;    TECHNIQUE:  Low dose axial images, sagittal and coronal reformations were obtained from the lung bases to the pubic symphysis following the IV administration of 100 mL of Omnipaque 350    FINDINGS:  The visualized portion of the base of the lungs, visualized portion of the heart, stomach, spleen, pancreas, liver, gallbladder, adrenal glands, visualized portion of the aorta, and bladder are unremarkable.  The bowel is unremarkable.  The appendix has a normal appearance.  The osseous structures are unremarkable.                               X-Ray Chest AP Portable (Final result)  Result time 06/03/20 06:16:16  "   Final result by Ventura Raya MD (06/03/20 06:16:16)                 Impression:      There is no radiographic evidence for acute intrathoracic process.      Electronically signed by: Ventura Raya  Date:    06/03/2020  Time:    06:16             Narrative:    EXAMINATION:  XR CHEST AP PORTABLE    CLINICAL HISTORY:  Chest Pain;    TECHNIQUE:  Single frontal view of the chest was performed.    COMPARISON:  April 12, 2020    FINDINGS:  Single portable chest view is submitted.  Cardiomediastinal silhouette appears appropriate.  There is no evidence for confluent infiltrate or consolidation, significant pleural effusion or pneumothorax.  The osseous structures appear intact.                                       APC / Resident Notes:   39-year-old male who presents for palpitations that started 1 hr prior to arrival.  He was seen recently at another facility for cocaine and methamphetamine use.  He reports that this is a "panic attack".  He denies pain of any sort at this time but is tachypneic and tachycardic.  He states that both of his hands are numb and cramping.  On physical exam the patient is afebrile and nontoxic in no apparent distress with exception of tachypnea.  Differential diagnosis includes drug abuse, malingering, suicidal ideations, toxic ingestion, hyperventilation, hypercalcemia or other metabolic disturbance, ACS MI,SVT.  I have ordered that a nrb mask be placed without o2 to act as co2 reservoir as an intervention for hyperventilation and ativan 1mg be given.  Following these interventions patient's heart rate slowed considerably and he reported some improvement.  Laboratories are pending.              ED Course as of Jun 05 1732 Wed Jun 03, 2020   0558 BP: 138/88 [VC]   0558 Pulse: 97 [VC]   0558 Resp: 18 [VC]   0558 SpO2: 100 % [VC]   0601 CBC: leukocyte count was normal, the H&H was normal. The platelet count was normal.       CBC auto differential [VC]   0604 39-year-old male with a " history of methamphetamine and cocaine use presents for palpitations and anxiety.  He denies pain of any kind but reports numbness in both arms.  He is hyperventilating upon initial assessment.  Bilateral breath sounds are clear to auscultation there is no edema present.  Patient is not diaphoretic.  Heart rate is in the 140s at this time.  I have ordered Ativan 1 mg IV and a non-rebreather mask be applied without the use of oxygen for period of 5 min or until the patient calms.    [VC]   0605 BP: 130/80 [VC]   0605 Pulse(!): 118 [VC]   0605 Resp(!): 21 [VC]   0605 Improved.  Pt reports some improvement.   SpO2: 100 % [VC]   0609 EKG , UNIFOCAL PVC, NO STEMI.    [VC]   0609 The chemistry was negative for hypo-or hyper natremia, kalemia, chloridemia, or other electrolyte abnormalities; BUN and creatinine were within normal limits indicating normal kidney function, ALT and AST were within normal limits indicating normal liver function, there was no transaminitis.       Comprehensive metabolic panel(!) [VC]   0622 Troponin I: <0.006 [VC]   0622 Pulse: 99 [VC]   0622 Resp: 18 [VC]   0622 SpO2: 100 % [VC]   0622 There is no radiographic evidence for acute intrathoracic process.     X-Ray Chest AP Portable [VC]   0634 BNP: <10 [VC]   0638 Pulse: 99 [VC]   0638 Resp: 18 [VC]   0638 SpO2: 99 % [VC]   0702 BP: 125/83 [VC]   0702 Pulse: 100 [VC]   0702 Resp: 20 [VC]   0702 SpO2: 99 % [VC]   0716 Magnesium: 1.9 [VC]   0733 Pos cocaine and amphetamine.   Drug screen panel, emergency [VC]   0804 Alcohol, Medical, Serum: <10 [VC]   0856 SBAR given to Dr. Villa    [VC]   0916 BP(!): 141/85 [VC]   0916 Pulse: 96 [VC]   0916 Resp: 17 [VC]   0916 SpO2: 98 % [VC]   0930 Patient is now reporting abdominal pain and chest pain.  The abdominal pain is limited to the right lower quadrant and the chest pain is on the left.  The patient is not diaphoretic, he is not nauseated or vomiting.  On physical exam the abdomen is soft but  tender x4 quads flaquito in the right lower quad. RRR no MCR.  I have updated sbar to Dr. Villa, we will get troponin, ua, ddimer, ct abd pel with iv contrast.    [VC]   0934   No acute findings.    Normal appendix.   CT Abdomen Pelvis With Contrast [VC]   0943 Gu exam with Anthony, NP, chaperone.    [VC]   0946 D-Dimer: 0.33 [VC]   0951 Troponin I: <0.006 [VC]   1008 BP(!): 168/82 [VC]   1010 Patient informNurse that he was feeling psychotic and depressed, possibly suicidal.  I question the patient he stated that he is not actively suicidal at this time but that he is concerned that he may become that way.  He is requesting inpatient psychological care for drug addiction and depression.  He has a history of suicidal attempt as evidence by scar to the left wrist any also reports a history of paranoid psychosis.    [VC]   1020 Awaiting telepsych consult.    [VC]   1030 UA is negative for infection, no nitrites, leukocytes, blood, or protein present.     Urinalysis, Reflex to Urine Culture Urine, Clean Catch(!) [VC]   1031 SBAR to Dr. Villa updated.    [VC]      ED Course User Index  [VC] Riccardo Goodman, DNP     Tele psych consult states that he does not feel that the patient is actively suicidal and is okay to follow-up outpatient.  The patient reports resolution of his symptoms and is discharged home in good condition to follow-up with psychiatry referral from community resource sheet provided.  He should return for any worsening or changes in condition in any of his conditions.See above for analyses of radiology, labs, and events during pt's visit and direct actions taken. Symptomatic therapies and return precautions on AVS.   Medication choices were made after reviewing allergies, medications, history, available laboratories. See below for discharge prescriptions if any and disposition.             Clinical Impression:       ICD-10-CM ICD-9-CM   1. Palpitations R00.2 785.1   2. Chest pain R07.9 786.50   3.  Amphetamine abuse F15.10 305.70   4. Cocaine abuse F14.10 305.60   5. Hyperventilation R06.4 786.01   6. Polysubstance abuse F19.10 305.90         Disposition:   Disposition: Discharged  Condition: Stable     ED Disposition Condition    Discharge Stable        ED Prescriptions     None        Follow-up Information     Follow up With Specialties Details Why Contact Info    Princess FREDO Duke MD Internal Medicine, Pediatrics Schedule an appointment as soon as possible for a visit   [address]  [facility] [address]  [phone]                                       Riccardo Goodman, [facility]  06/05/20 1733

## 2020-06-03 NOTE — DISCHARGE INSTRUCTIONS
Stop using drugs.  Return to the Emergency department for any worsening or failure to improve, otherwise follow up with your primary care provider.

## 2020-06-03 NOTE — ED NOTES
"Pt is clinching fingers together stating "I am having a stroke", pt palms are sweaty, but when moved by nurse they easily separate until patient squeezes them together again allowing enough movement to hold his cell phone and push buttons on it, pt can move all extremities with equal grasps/strength, pt is hyperventilating while talking to family on the phone stating that he is dying and family is asked to call back later so we care for the patient, Pt HR is elevated which is consistent with hyperventilation and confirmed by Riccardo NP at bedside and verbal order for ativan.     "

## 2020-06-03 NOTE — ED NOTES
"MD and RN discussed feeling of depression and anxiety with pt. Pt states "he is torn about what to do with his life", "he has tried to get help for his addiction, but often relapses and find himself hanging around the wrong people". When RN asked if he has any thoughts of harming himself he responded with "I have to think of how to answer that" to MD pt said "he doesn't think he would but if he was in the right situation he might". Pt has a hx of suicide attempt. MD placed orders for tele psych.  "

## 2020-06-03 NOTE — CONSULTS
"[facility]  Psychiatry  Telepsychiatry Consult Note    Please see previous notes:    Patient agreeable to consultation via telepsychiatry.    Tele-Consultation from Psychiatry started: 6/3/2020 at 1100  The chief complaint leading to psychiatric consultation is:previous suicidality, depression, denies current si/hi but may be risk to self  This consultation was requested by Riccardo Goodman DNP, the Emergency Department attending physician.  The location of the consulting psychiatrist is [address] [place] [address].  The patient location is  [facility] EMERGENCY DEPARTMENT     Patient Identification:   Lawrence Rodriguez is a 39 y.o. male.    Patient information was obtained from patient.  Patient presented voluntarily to the Emergency Department by private vehicle.    Consults  Subjective:     History of Present Illness:    Per ED HPI: "Patient is a 39-year-old male who presents with palpitations x1 hour.  He was seen recently at another facility for methamphetamine and cocaine abuse with similar symptoms.  He states he is not having pain anywhere but feels numbness in both of his arms.  He is anxious and hyperventilating during that shell assessment.  He endorses shortness of breath but denies nausea or vomiting.Patient informNurse that he was feeling psychotic and depressed, possibly suicidal.  I question the patient he stated that he is not actively suicidal at this time but that he is concerned that he may become that way.  He is requesting inpatient psychological care for drug addiction and depression.  He has a history of suicidal attempt as evidence by scar to the left wrist any also reports a history of paranoid psychosis. Consulted Telepsych".     Psychiatric Interview:  Pt reports that he relapsed on cocaine.  States he lives with GF who also uses cocaine and knows he needs to go to rehab.  Pt denies SI/HI.  Pt reports that he would call 911 and seek help if he becomes suicidal.  Thought " "processes appear clean and organized. No paranoia.  Denies AH/VH.      Psychiatric History:   Previous Psychiatric Hospitalizations: Yes , multiple  Previous Medication Trials: Yes , multiple  Previous Suicide Attempts: yes hx of cutting wrist  History of Violence: no  History of Depression: yes  History of Fatimah: no  History of Auditory/Visual Hallucination no  History of Delusions: no  Outpatient psychiatrist (current & past): Yes    Substance Abuse History:  Tobacco:Yes  Alcohol: No  Illicit Substances:Yes  Detox/Rehab: Yes    Legal History: Past charges/incarcerations: denies     Family Psychiatric History: denies    Social History:  Developmental/Childhood:Achieved all developmental milestones timely  *Education:High School Diploma  Employment Status/Finances:Unemployed   Relationship Status/Sexual Orientation: lives with girlfriend  Children: 0  Housing Status: Home    history:  NO  Access to gun: NO    Psychiatric Mental Status Exam:  Arousal: alert  Sensorium/Orientation: oriented to grossly intact  Behavior/Cooperation: normal, cooperative   Speech: normal tone, normal rate, normal pitch, normal volume  Language: grossly intact  Mood: " okay "   Affect: appropriate  Thought Process: normal and logical  Thought Content:   Auditory hallucinations: NO  Visual hallucinations: NO  Paranoia: NO  Delusions:  NO  Suicidal ideation: NO  Homicidal ideation: NO  Attention/Concentration:  intact  Memory:    Recent:  Intact   Remote: Intact   3/3 immediate, 3/3 at 5 min  Fund of Knowledge: Intact   Abstract reasoning: proverbs were abstract  Insight: intact  Judgment: behavior is adequate to circumstances      Past Medical History:   Past Medical History:   Diagnosis Date    Anxiety     Depression     PTSD (post-traumatic stress disorder)       Laboratory Data:   Labs Reviewed   COMPREHENSIVE METABOLIC PANEL - Abnormal; Notable for the following components:       Result Value    Glucose 152 (*)     All other " components within normal limits   URINALYSIS, REFLEX TO URINE CULTURE - Abnormal; Notable for the following components:    Urobilinogen, UA 2.0-3.0 (*)     All other components within normal limits    Narrative:     Preferred Collection Type->Urine, Clean Catch   CBC W/ AUTO DIFFERENTIAL   TROPONIN I   B-TYPE NATRIURETIC PEPTIDE   DRUG SCREEN PANEL, URINE EMERGENCY   ALCOHOL,MEDICAL (ETHANOL)   MAGNESIUM   TROPONIN I   D DIMER, QUANTITATIVE     Neurological History:  Seizures: denies  Head trauma:  denies    Allergies:   Review of patient's allergies indicates:  No Known Allergies    Medications in ER:   Medications   lorazepam injection 1 mg (1 mg Intravenous Given 6/3/20 0558)   ondansetron injection 4 mg (4 mg Intravenous Given 6/3/20 0903)   sodium chloride 0.9% bolus 1,000 mL (1,000 mLs Intravenous New Bag 6/3/20 0903)   ketorolac injection 15 mg (15 mg Intravenous Given 6/3/20 0904)   iohexoL (OMNIPAQUE 350) injection 100 mL (100 mLs Intravenous Given 6/3/20 0919)       Medications at home: Zoloft, Gabapentin, Wellbutrin    No new subjective & objective note has been filed under this hospital service since the last note was generated.      Assessment - Diagnosis - Goals:     Diagnosis/Impression: Polysubstance Abuse    Rec: Pt currently does not meet criteria for PEC or involuntary inpatient treatment.  Pt denies SI/HI/AVH and verbally contracts for safety. Follow-up with outpatient provider. Please have  provide resources for drug rehab.  No medication changes recommended at this time.      [facility] ( Men & Women) - DOES NOT ACCEPT PRIVATE INSURANCE - DOES NOT ACCEPT [facility]  [phone]  [address] ( MEN) : [phone] Fax:[phone]  [facility] [place] [address]  **Only accepts Medicaid and has to be primary Insurance   [facility] (Men) Only takes Medicaid  [address] [facility] [place]  [phone] AAMIR fax [phone]  Extension: 1000 for  admissions  Extension: 1029 for clinical director (if PT needs referral from [facility] to other Facility)  [facility]  [facility] (Women)- - Only takes Medicaid  [address] [facility] [address]  [phone]     [facility] ( MEN): [address], [place] [address]  [phone] - Only takes Medicaid Fax [phone]   [employer] - Medicaid Plans: [employer]; [employer]; [employer]; [employer], [place] [employer], Other Insurance  [address] [place] [address]  Phone: [phone] or [phone]  Fax: [phone]    Time with patient: 30 minutes    More than 50% of the time was spent counseling/coordinating care    Consulting clinician was informed of the encounter and consult note.    Consultation ended: 6/3/2020 at 1130    Sumit Womack III, NP   Psychiatry  [facility]
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
## 2020-06-03 NOTE — ED NOTES
Pt is redirectable, and vitals return to normal after placing non-rebreather with no oxygen and talking to patient about home meds.  Pt is paranoid and defensive.  NP Riccardo at bedside, family told that they will have to call back and were ensured of patient safety due to enormous patient distraction.

## 2020-06-03 NOTE — ED NOTES
Pt arrives via EMS after c/o chest pain and palpitations x 1hr pt denies drug use today.  NSR, VSS

## 2022-10-05 NOTE — ED NOTES
Patient placed on continuous cardiac monitor, automatic blood pressure cuff and continuous pulse oximeter.   Stelara Pregnancy And Lactation Text: This medication is Pregnancy Category B and is considered safe during pregnancy. It is unknown if this medication is excreted in breast milk.

## 2022-12-12 ENCOUNTER — HOSPITAL ENCOUNTER (EMERGENCY)
Facility: OTHER | Age: 42
Discharge: HOME | End: 2022-12-12
Attending: EMERGENCY MEDICINE
Payer: MEDICAID

## 2022-12-12 VITALS
OXYGEN SATURATION: 95 % | HEIGHT: 70 IN | RESPIRATION RATE: 18 BRPM | BODY MASS INDEX: 25.05 KG/M2 | HEART RATE: 62 BPM | TEMPERATURE: 99 F | SYSTOLIC BLOOD PRESSURE: 138 MMHG | WEIGHT: 175 LBS | DIASTOLIC BLOOD PRESSURE: 79 MMHG

## 2022-12-12 DIAGNOSIS — M54.9 BACK PAIN, UNSPECIFIED BACK LOCATION, UNSPECIFIED BACK PAIN LATERALITY, UNSPECIFIED CHRONICITY: ICD-10-CM

## 2022-12-12 DIAGNOSIS — M48.00 SPINAL STENOSIS, UNSPECIFIED SPINAL REGION: Primary | ICD-10-CM

## 2022-12-12 DIAGNOSIS — M54.16 LUMBAR RADICULOPATHY: ICD-10-CM

## 2022-12-12 PROCEDURE — 25000003 PHARM REV CODE 250

## 2022-12-12 PROCEDURE — 63600175 PHARM REV CODE 636 W HCPCS

## 2022-12-12 PROCEDURE — 96372 THER/PROPH/DIAG INJ SC/IM: CPT

## 2022-12-12 PROCEDURE — 99285 EMERGENCY DEPT VISIT HI MDM: CPT | Mod: 25

## 2022-12-12 RX ORDER — DEXAMETHASONE SODIUM PHOSPHATE 4 MG/ML
8 INJECTION, SOLUTION INTRA-ARTICULAR; INTRALESIONAL; INTRAMUSCULAR; INTRAVENOUS; SOFT TISSUE
Status: COMPLETED | OUTPATIENT
Start: 2022-12-12 | End: 2022-12-12

## 2022-12-12 RX ORDER — DICLOFENAC SODIUM 10 MG/G
2 GEL TOPICAL 4 TIMES DAILY
Qty: 100 G | Refills: 0 | Status: SHIPPED | OUTPATIENT
Start: 2022-12-12

## 2022-12-12 RX ORDER — IBUPROFEN 600 MG/1
600 TABLET ORAL EVERY 6 HOURS PRN
Qty: 20 TABLET | Refills: 0 | Status: SHIPPED | OUTPATIENT
Start: 2022-12-12

## 2022-12-12 RX ORDER — ORPHENADRINE CITRATE 100 MG/1
100 TABLET, EXTENDED RELEASE ORAL
Status: COMPLETED | OUTPATIENT
Start: 2022-12-12 | End: 2022-12-12

## 2022-12-12 RX ORDER — LIDOCAINE 50 MG/G
1 PATCH TOPICAL
Status: DISCONTINUED | OUTPATIENT
Start: 2022-12-12 | End: 2022-12-12 | Stop reason: HOSPADM

## 2022-12-12 RX ORDER — LIDOCAINE 50 MG/G
1 PATCH TOPICAL DAILY
Qty: 15 PATCH | Refills: 0 | Status: SHIPPED | OUTPATIENT
Start: 2022-12-12

## 2022-12-12 RX ORDER — CYCLOBENZAPRINE HCL 10 MG
10 TABLET ORAL 3 TIMES DAILY PRN
Qty: 15 TABLET | Refills: 0 | Status: SHIPPED | OUTPATIENT
Start: 2022-12-12 | End: 2022-12-17

## 2022-12-12 RX ORDER — KETOROLAC TROMETHAMINE 30 MG/ML
30 INJECTION, SOLUTION INTRAMUSCULAR; INTRAVENOUS
Status: COMPLETED | OUTPATIENT
Start: 2022-12-12 | End: 2022-12-12

## 2022-12-12 RX ADMIN — KETOROLAC TROMETHAMINE 30 MG: 30 INJECTION, SOLUTION INTRAMUSCULAR; INTRAVENOUS at 05:12

## 2022-12-12 RX ADMIN — LIDOCAINE 1 PATCH: 50 PATCH CUTANEOUS at 05:12

## 2022-12-12 RX ADMIN — ORPHENADRINE CITRATE 100 MG: 100 TABLET, EXTENDED RELEASE ORAL at 05:12

## 2022-12-12 RX ADMIN — DEXAMETHASONE SODIUM PHOSPHATE 8 MG: 4 INJECTION INTRA-ARTICULAR; INTRALESIONAL; INTRAMUSCULAR; INTRAVENOUS; SOFT TISSUE at 05:12

## 2022-12-12 NOTE — ED PROVIDER NOTES
Encounter Date: 12/12/2022    SCRIBE #1 NOTE: I, Maria R Horan, am scribing for, and in the presence of,  Everette Church MD. I have scribed the entire note.     History     Chief Complaint   Patient presents with    Back Pain     Patient presented to ER with complaint of lower back and right hip pain. Pt stated he has 3 ruptured discs in his back. Pt stated he walked 4 miles yesterday.      Time seen by provider: 5:19 PM    This is a 42 y.o. male who presents with complaint of     The history is provided by the patient.   Review of patient's allergies indicates:  No Known Allergies  Past Medical History:   Diagnosis Date    Anxiety     Depression     PTSD (post-traumatic stress disorder)      No past surgical history on file.  No family history on file.  Social History     Tobacco Use    Smoking status: Some Days    Smokeless tobacco: Never   Substance Use Topics    Alcohol use: Yes    Drug use: Yes     Types: IV, Methamphetamines     Review of Systems  Constitutional-no fever  HEENT-no congestion  Eyes-no redness  Respiratory-no shortness of breath  Cardio-no chest pain  GI-no abdominal pain  Endocrine-no cold intolerance  -no difficulty urinating  MSK-no myalgias  Skin-no rashes  Allergy-no environmental allergy  Neurologic-, no headache  Hematology-no swollen nodes  Behavioral-no confusion   Physical Exam     Initial Vitals [12/12/22 1546]   BP Pulse Resp Temp SpO2   (!) 133/91 84 17 99.1 °F (37.3 °C) 100 %      MAP       --         Physical Exam  Constitutional: ***Well appearing, no distress.  Eyes: Conjunctivae normal.  ENT       Head: Normocephalic, atraumatic.       Nose: No congestion.       Mouth/Throat: Mucous membranes are moist.  Hematological/Lymphatic/Immunilogical: No cervical lymphadenopathy.  Cardiovascular: Normal rate, regular rhythm. Normal and symmetric distal pulses.  Respiratory: Normal respiratory effort. Breath sounds are normal.  Gastrointestinal: Soft, nontender.  "  Musculoskeletal: Normal range of motion in all extremities. No obvious deformities or swelling.  Neurologic: Alert, oriented. Normal speech and language. No gross focal neurologic deficits are appreciated.  Skin: Skin is warm, dry. No rash noted.  Psychiatric: Mood and affect are normal.    ED Course   Procedures  Labs Reviewed - No data to display       Imaging Results              CT Lumbar Spine Without Contrast (In process)                      Medications   dexAMETHasone injection 8 mg (has no administration in time range)   ketorolac injection 30 mg (has no administration in time range)   orphenadrine 12 hr tablet 100 mg (has no administration in time range)   LIDOcaine 5 % patch 1 patch (has no administration in time range)     Medical Decision Making:   History:   Old Medical Records: I decided to obtain old medical records.  Clinical Tests:   Lab Tests: Ordered and Reviewed  Radiological Study: Ordered and Reviewed  Medical Tests: Ordered and Reviewed        Scribe Attestation:   Scribe #1: I performed the above scribed service and the documentation accurately describes the services I performed. I attest to the accuracy of the note.                 Physician Attestation for Scribe: I, ***, reviewed documentation as scribed in my presence, which is both accurate and complete.   Clinical Impression:    ***Please document a Clinical Impression and click the "Refresh" button to refresh your note and automatically pull in before signing.***         "

## 2022-12-12 NOTE — ED PROVIDER NOTES
Encounter Date: 12/12/2022       History     Chief Complaint   Patient presents with    Back Pain     Patient presented to ER with complaint of lower back and right hip pain. Pt stated he has 3 ruptured discs in his back. Pt stated he walked 4 miles yesterday.      43 yo man with chronic low back pain that presents for evaluation of back pain which is similar to previous but more intense than previously.  He has been evaluated previously for the same and told that he has multiple bulging discs in his lower back currently takes baclofen addition to gabapentin and Suboxone but has had worsening of pain over last several days after having walked a long distance yesterday is now having persistent pain along the right buttocks into the leg.  Denies any loss of bladder or bowel continence.  Has been unable to follow-up with a spine surgeon as result of issues related to coverage and currently has a pending appointment with a neurologist.    Review of patient's allergies indicates:  No Known Allergies  Past Medical History:   Diagnosis Date    Anxiety     Depression     PTSD (post-traumatic stress disorder)      History reviewed. No pertinent surgical history.  History reviewed. No pertinent family history.  Social History     Tobacco Use    Smoking status: Some Days    Smokeless tobacco: Never   Substance Use Topics    Alcohol use: Yes    Drug use: Yes     Types: IV, Methamphetamines     Review of Systems  Constitutional-no fever  HEENT-no congestion  Eyes-no redness  Respiratory-no shortness of breath  Cardio-no chest pain  GI-no abdominal pain  Endocrine-no cold intolerance  -no difficulty urinating  MSK-positive back pain  Skin-no rashes  Allergy-no environmental allergy  Neurologic-, no headache  Hematology-no swollen nodes  Behavioral-no confusion  Physical Exam     Initial Vitals [12/12/22 1546]   BP Pulse Resp Temp SpO2   (!) 133/91 84 17 99.1 °F (37.3 °C) 100 %      MAP       --         Physical  Exam  Constitutional: 41 yo man in moderate distress  Eyes: Conjunctivae normal.  ENT       Head: Normocephalic, atraumatic.       Nose: Normal external appearance        Mouth/Throat: no strigulous respirations   Hematological/Lymphatic/Immunilogical: no visible lymphadenopathy   Cardiovascular: Normal rate,   Respiratory: Normal respiratory effort.   Gastrointestinal: non distended   Musculoskeletal: Normal range of motion in all extremities. + straight leg raise test on the right  Neurologic: Alert, oriented. Normal speech and language. No gross focal neurologic deficits are appreciated.  Skin: Skin is warm, dry. No rash noted.  Psychiatric: Mood and affect are normal.   ED Course   Procedures  Labs Reviewed - No data to display       Imaging Results              CT Lumbar Spine Without Contrast (Final result)  Result time 12/12/22 18:56:47      Final result by Bianca Infante MD (12/12/22 18:56:47)                   Impression:      Large disc herniated at L4/L5 with significant central canal stenosis.    No acute fracture.  Straightening of the normal lumbar lordosis.    Right pars defect.    Markedly large colonic stool.  Question constipation.      Electronically signed by: Bianca Infante  Date:    12/12/2022  Time:    18:56               Narrative:    EXAMINATION:  CT LUMBAR SPINE WITHOUT CONTRAST    CLINICAL HISTORY:  Low back pain, symptoms persist with > 6wks conservative treatment;Lumbar radiculopathy, symptoms persist with conservative treatment;    TECHNIQUE:  1.25 mm axial images were obtained through the lumbar spine.  Contrast was not administered.  Coronal and sagittal reformatted images were provided.    COMPARISON:  None.    FINDINGS:  There is straightening of the normal lumbar lordosis, which may indicate muscular spasm.  The vertebral body heights are normal.  The intervertebral disc spaces are maintained.  At L4/L5, there is a large herniated disc with significant central canal stenosis.   There is a right pars defect.  There is small vacuum phenomena and sclerosing at the sacroiliac joints consistent with degenerative change.  Moderately large colonic stool is seen.  At the lung bases, there is mild bibasilar atelectatic change.                                       Medications   dexAMETHasone injection 8 mg (8 mg Intramuscular Given 12/12/22 1728)   ketorolac injection 30 mg (30 mg Intramuscular Given 12/12/22 1729)   orphenadrine 12 hr tablet 100 mg (100 mg Oral Given 12/12/22 1728)     Medical Decision Making:   History:   Old Medical Records: I decided to obtain old medical records.  Old Records Summarized: records from clinic visits and records from previous admission(s).  Differential Diagnosis:   Low back pain is a profoundly broad differential including benign back strains and spasms to serious structural issues such as cauda equina syndrome, prolapsed disc, epidural abscess or pathologic fractures even to back pain mimics like pyelonephritis, AAA or ischemic bowel.    This patient does not have significant fever, no neurologic impairment to suggest compressive lesion such as epidural abscess or cauda equina syndrome.   Reflexes are appropriate and at baseline.     The differential was considered and the appropriate diagnostics were ordered therefore with a disposition determination in line with the most likely underlying cause.   Clinical Tests:   Radiological Study: Ordered and Reviewed                        Clinical Impression:   Final diagnoses:  [M48.00] Spinal stenosis, unspecified spinal region (Primary)  [M54.16] Lumbar radiculopathy  [M54.9] Back pain, unspecified back location, unspecified back pain laterality, unspecified chronicity        ED Disposition Condition    Discharge Stable          ED Prescriptions       Medication Sig Dispense Start Date End Date Auth. Provider    cyclobenzaprine (FLEXERIL) 10 MG tablet Take 1 tablet (10 mg total) by mouth 3 (three) times daily as  needed for Muscle spasms. 15 tablet 12/12/2022 12/17/2022 Everette Church MD    ibuprofen (ADVIL,MOTRIN) 600 MG tablet Take 1 tablet (600 mg total) by mouth every 6 (six) hours as needed for Pain. 20 tablet 12/12/2022 -- Everette Church MD    diclofenac sodium (VOLTAREN) 1 % Gel Apply 2 g topically 4 (four) times daily. 100 g 12/12/2022 -- Everette Church MD    LIDOcaine (LIDODERM) 5 % Place 1 patch onto the skin once daily. Remove & Discard patch within 12 hours or as directed by MD 15 patch 12/12/2022 -- Everette Church MD          Follow-up Information       Follow up With Specialties Details Why Contact Info Additional Information    [facility] Schedule an appointment as soon as possible for a visit in 2 days If symptoms worsen, For a follow up visit about today [address], [address]  [place] [facility] [address] [phone] [facility], 4th Floor Please park in [address] and use [place] elevators             Everette Church MD  12/13/22 0016       Everette Church MD  12/13/22 0017

## 2022-12-12 NOTE — FIRST PROVIDER EVALUATION
"Medical screening examination initiated.  I have conducted a focused provider triage encounter, findings are as follows:    Brief history of present illness:  42y.o with substance abuse on suboxone and chronic back pain presents to the ED with c/o worsening back pain. States he woke up with worsened pain after walking 4 hours yesterday. Was supposed to have an appointment with neurology on 11/17/22, but was told the doctor wasn't available when he arrived.  Last used baclofen, gabapentin, suboxone 18mg today without alleviation of symptoms.    Vitals:    12/12/22 1546   BP: (!) 133/91   BP Location: Left arm   Patient Position: Sitting   Pulse: 84   Resp: 17   Temp: 99.1 °F (37.3 °C)   TempSrc: Oral   SpO2: 100%   Weight: 79.4 kg (175 lb)   Height: 5' 10" (1.778 m)       Pertinent physical exam:  no red flags on exam. Fatiguable weakness, strength 4/5 to right hip. No neurological deficits.    Brief workup plan:    [facility] done at [facility] on 8/26/2022 shows bulging disc at L3-L4, as well as L4-L5 with moderate foramina stenosis.   - CT lumbar spine  - pain control      Preliminary workup initiated; this workup will be continued and followed by the physician or advanced practice provider that is assigned to the patient when roomed.  "

## 2022-12-13 NOTE — DISCHARGE INSTRUCTIONS
Mr. Rodriguez,    Thank you for letting me care for you today! It was nice meeting you, and I hope you feel better soon.   If you would like access to your chart and what was done today please utilize the [facility].   Please come back to [facility] for all of your future medical needs.    Our goal in the emergency department is to always give you outstanding care and exceptional service. You may receive a survey by mail or e-mail in the next week regarding your experience in our ED. We would greatly appreciate you completing and returning the survey. Your feedback provides us with a way to recognize our staff who give very good care and it helps us learn how to improve when your experience was below our aspiration of excellence.     Sincerely,    Everette Church MD  Board Certified Emergency Physician

## 2022-12-13 NOTE — ED TRIAGE NOTES
Pt reports to ED with lower back pain that radiates down his right hip and leg. Reports numbness and tingling in right foot. He states that this has been a chronic problem and he came in today because it was getting much worse. Denies fever. Aaox4. In no acute distress.